# Patient Record
Sex: FEMALE | Race: WHITE | NOT HISPANIC OR LATINO
[De-identification: names, ages, dates, MRNs, and addresses within clinical notes are randomized per-mention and may not be internally consistent; named-entity substitution may affect disease eponyms.]

---

## 2017-01-19 ENCOUNTER — APPOINTMENT (OUTPATIENT)
Dept: OBGYN | Facility: CLINIC | Age: 22
End: 2017-01-19

## 2017-01-19 VITALS
WEIGHT: 166 LBS | HEIGHT: 69 IN | DIASTOLIC BLOOD PRESSURE: 80 MMHG | BODY MASS INDEX: 24.59 KG/M2 | SYSTOLIC BLOOD PRESSURE: 140 MMHG

## 2017-07-06 ENCOUNTER — APPOINTMENT (OUTPATIENT)
Dept: OBGYN | Facility: CLINIC | Age: 22
End: 2017-07-06

## 2017-07-06 VITALS
SYSTOLIC BLOOD PRESSURE: 110 MMHG | DIASTOLIC BLOOD PRESSURE: 70 MMHG | WEIGHT: 167 LBS | BODY MASS INDEX: 24.73 KG/M2 | HEIGHT: 69 IN

## 2017-07-17 LAB
C TRACH RRNA SPEC QL NAA+PROBE: NORMAL
N GONORRHOEA RRNA SPEC QL NAA+PROBE: NORMAL
SOURCE AMPLIFICATION: NORMAL
SOURCE AMPLIFICATION: NORMAL
T VAGINALIS RRNA SPEC QL NAA+PROBE: NEGATIVE

## 2018-01-11 ENCOUNTER — MEDICATION RENEWAL (OUTPATIENT)
Age: 23
End: 2018-01-11

## 2018-06-08 ENCOUNTER — APPOINTMENT (OUTPATIENT)
Dept: DERMATOLOGY | Facility: CLINIC | Age: 23
End: 2018-06-08

## 2018-07-06 ENCOUNTER — APPOINTMENT (OUTPATIENT)
Dept: DERMATOLOGY | Facility: CLINIC | Age: 23
End: 2018-07-06
Payer: COMMERCIAL

## 2018-07-06 PROCEDURE — 99214 OFFICE O/P EST MOD 30 MIN: CPT

## 2018-07-09 ENCOUNTER — APPOINTMENT (OUTPATIENT)
Dept: OBGYN | Facility: CLINIC | Age: 23
End: 2018-07-09
Payer: COMMERCIAL

## 2018-07-09 VITALS
WEIGHT: 170 LBS | DIASTOLIC BLOOD PRESSURE: 70 MMHG | BODY MASS INDEX: 25.18 KG/M2 | HEIGHT: 69 IN | SYSTOLIC BLOOD PRESSURE: 100 MMHG

## 2018-07-09 PROCEDURE — 99395 PREV VISIT EST AGE 18-39: CPT

## 2018-07-10 LAB
C TRACH RRNA SPEC QL NAA+PROBE: NOT DETECTED
N GONORRHOEA RRNA SPEC QL NAA+PROBE: NOT DETECTED
SOURCE TP AMPLIFICATION: NORMAL

## 2018-07-16 LAB — CYTOLOGY CVX/VAG DOC THIN PREP: NORMAL

## 2018-11-21 ENCOUNTER — EMERGENCY (EMERGENCY)
Facility: HOSPITAL | Age: 23
LOS: 0 days | Discharge: HOME | End: 2018-11-21
Attending: EMERGENCY MEDICINE | Admitting: EMERGENCY MEDICINE
Payer: COMMERCIAL

## 2018-11-21 VITALS
TEMPERATURE: 98 F | RESPIRATION RATE: 14 BRPM | OXYGEN SATURATION: 98 % | SYSTOLIC BLOOD PRESSURE: 135 MMHG | DIASTOLIC BLOOD PRESSURE: 79 MMHG | HEART RATE: 99 BPM

## 2018-11-21 VITALS — WEIGHT: 149.91 LBS | HEIGHT: 68 IN

## 2018-11-21 DIAGNOSIS — N12 TUBULO-INTERSTITIAL NEPHRITIS, NOT SPECIFIED AS ACUTE OR CHRONIC: ICD-10-CM

## 2018-11-21 DIAGNOSIS — R35.0 FREQUENCY OF MICTURITION: ICD-10-CM

## 2018-11-21 LAB
ANION GAP SERPL CALC-SCNC: 9 MMOL/L — SIGNIFICANT CHANGE UP (ref 5–17)
APPEARANCE UR: ABNORMAL
BASOPHILS # BLD AUTO: 0.06 K/UL — SIGNIFICANT CHANGE UP (ref 0–0.2)
BASOPHILS NFR BLD AUTO: 0.5 % — SIGNIFICANT CHANGE UP (ref 0–2)
BILIRUB UR-MCNC: NEGATIVE — SIGNIFICANT CHANGE UP
BUN SERPL-MCNC: 9 MG/DL — SIGNIFICANT CHANGE UP (ref 7–23)
CALCIUM SERPL-MCNC: 8.5 MG/DL — SIGNIFICANT CHANGE UP (ref 8.5–10.1)
CHLORIDE SERPL-SCNC: 107 MMOL/L — SIGNIFICANT CHANGE UP (ref 96–108)
CO2 SERPL-SCNC: 26 MMOL/L — SIGNIFICANT CHANGE UP (ref 22–31)
COLOR SPEC: YELLOW — SIGNIFICANT CHANGE UP
CREAT SERPL-MCNC: 0.66 MG/DL — SIGNIFICANT CHANGE UP (ref 0.5–1.3)
DIFF PNL FLD: ABNORMAL
EOSINOPHIL # BLD AUTO: 0.08 K/UL — SIGNIFICANT CHANGE UP (ref 0–0.5)
EOSINOPHIL NFR BLD AUTO: 0.7 % — SIGNIFICANT CHANGE UP (ref 0–6)
GLUCOSE SERPL-MCNC: 98 MG/DL — SIGNIFICANT CHANGE UP (ref 70–99)
GLUCOSE UR QL: NEGATIVE MG/DL — SIGNIFICANT CHANGE UP
HCT VFR BLD CALC: 40.5 % — SIGNIFICANT CHANGE UP (ref 34.5–45)
HGB BLD-MCNC: 13.5 G/DL — SIGNIFICANT CHANGE UP (ref 11.5–15.5)
IMM GRANULOCYTES NFR BLD AUTO: 0.3 % — SIGNIFICANT CHANGE UP (ref 0–1.5)
INR BLD: 1.05 RATIO — SIGNIFICANT CHANGE UP (ref 0.88–1.16)
KETONES UR-MCNC: NEGATIVE — SIGNIFICANT CHANGE UP
LEUKOCYTE ESTERASE UR-ACNC: ABNORMAL
LYMPHOCYTES # BLD AUTO: 1.37 K/UL — SIGNIFICANT CHANGE UP (ref 1–3.3)
LYMPHOCYTES # BLD AUTO: 11.2 % — LOW (ref 13–44)
MCHC RBC-ENTMCNC: 30.3 PG — SIGNIFICANT CHANGE UP (ref 27–34)
MCHC RBC-ENTMCNC: 33.3 GM/DL — SIGNIFICANT CHANGE UP (ref 32–36)
MCV RBC AUTO: 90.8 FL — SIGNIFICANT CHANGE UP (ref 80–100)
MONOCYTES # BLD AUTO: 0.73 K/UL — SIGNIFICANT CHANGE UP (ref 0–0.9)
MONOCYTES NFR BLD AUTO: 6 % — SIGNIFICANT CHANGE UP (ref 2–14)
NEUTROPHILS # BLD AUTO: 9.91 K/UL — HIGH (ref 1.8–7.4)
NEUTROPHILS NFR BLD AUTO: 81.3 % — HIGH (ref 43–77)
NITRITE UR-MCNC: NEGATIVE — SIGNIFICANT CHANGE UP
PH UR: 7 — SIGNIFICANT CHANGE UP (ref 5–8)
PLATELET # BLD AUTO: 265 K/UL — SIGNIFICANT CHANGE UP (ref 150–400)
POTASSIUM SERPL-MCNC: 4.3 MMOL/L — SIGNIFICANT CHANGE UP (ref 3.5–5.3)
POTASSIUM SERPL-SCNC: 4.3 MMOL/L — SIGNIFICANT CHANGE UP (ref 3.5–5.3)
PROT UR-MCNC: 100 MG/DL
PROTHROM AB SERPL-ACNC: 11.7 SEC — SIGNIFICANT CHANGE UP (ref 10–12.9)
RBC # BLD: 4.46 M/UL — SIGNIFICANT CHANGE UP (ref 3.8–5.2)
RBC # FLD: 12.8 % — SIGNIFICANT CHANGE UP (ref 10.3–14.5)
SODIUM SERPL-SCNC: 142 MMOL/L — SIGNIFICANT CHANGE UP (ref 135–145)
SP GR SPEC: 1 — LOW (ref 1.01–1.02)
UROBILINOGEN FLD QL: NEGATIVE MG/DL — SIGNIFICANT CHANGE UP
WBC # BLD: 12.19 K/UL — HIGH (ref 3.8–10.5)
WBC # FLD AUTO: 12.19 K/UL — HIGH (ref 3.8–10.5)

## 2018-11-21 PROCEDURE — 99285 EMERGENCY DEPT VISIT HI MDM: CPT | Mod: 25

## 2018-11-21 PROCEDURE — 74176 CT ABD & PELVIS W/O CONTRAST: CPT | Mod: 26

## 2018-11-21 RX ORDER — AZTREONAM 2 G
1 VIAL (EA) INJECTION
Qty: 28 | Refills: 0 | OUTPATIENT
Start: 2018-11-21 | End: 2018-12-04

## 2018-11-21 RX ORDER — SODIUM CHLORIDE 9 MG/ML
1000 INJECTION INTRAMUSCULAR; INTRAVENOUS; SUBCUTANEOUS ONCE
Qty: 0 | Refills: 0 | Status: COMPLETED | OUTPATIENT
Start: 2018-11-21 | End: 2018-11-21

## 2018-11-21 RX ORDER — CEFTRIAXONE 500 MG/1
1000 INJECTION, POWDER, FOR SOLUTION INTRAMUSCULAR; INTRAVENOUS ONCE
Qty: 0 | Refills: 0 | Status: COMPLETED | OUTPATIENT
Start: 2018-11-21 | End: 2018-11-21

## 2018-11-21 RX ORDER — CEFTRIAXONE 500 MG/1
1 INJECTION, POWDER, FOR SOLUTION INTRAMUSCULAR; INTRAVENOUS ONCE
Qty: 0 | Refills: 0 | Status: DISCONTINUED | OUTPATIENT
Start: 2018-11-21 | End: 2018-11-21

## 2018-11-21 RX ORDER — MORPHINE SULFATE 50 MG/1
2 CAPSULE, EXTENDED RELEASE ORAL ONCE
Qty: 0 | Refills: 0 | Status: DISCONTINUED | OUTPATIENT
Start: 2018-11-21 | End: 2018-11-21

## 2018-11-21 RX ADMIN — SODIUM CHLORIDE 1000 MILLILITER(S): 9 INJECTION INTRAMUSCULAR; INTRAVENOUS; SUBCUTANEOUS at 14:42

## 2018-11-21 RX ADMIN — MORPHINE SULFATE 2 MILLIGRAM(S): 50 CAPSULE, EXTENDED RELEASE ORAL at 13:56

## 2018-11-21 RX ADMIN — CEFTRIAXONE 1000 MILLIGRAM(S): 500 INJECTION, POWDER, FOR SOLUTION INTRAMUSCULAR; INTRAVENOUS at 15:12

## 2018-11-21 RX ADMIN — SODIUM CHLORIDE 2000 MILLILITER(S): 9 INJECTION INTRAMUSCULAR; INTRAVENOUS; SUBCUTANEOUS at 13:42

## 2018-11-21 NOTE — ED STATDOCS - NS_ ATTENDINGSCRIBEDETAILS _ED_A_ED_FT
I, Carlos Forde MD,  performed the initial face to face bedside interview with this patient regarding history of present illness, review of symptoms and relevant past medical, social and family history.  I completed an independent physical examination.    The history, relevant review of systems, past medical and surgical history, medical decision making, and physical examination was documented by the scribe in my presence and I attest to the accuracy of the documentation.

## 2018-11-21 NOTE — ED ADULT NURSE NOTE - OBJECTIVE STATEMENT
Pt alert and oriented x3. pt presents with Rt sided flank pain and urinary symptoms for a few days. denies hematuria.

## 2018-11-21 NOTE — ED STATDOCS - ATTENDING CONTRIBUTION TO CARE
I, Carlos Forde MD,  performed the initial face to face bedside interview with this patient regarding history of present illness, review of symptoms and relevant past medical, social and family history.  I completed an independent physical examination.  I was the initial provider who evaluated this patient. I have signed out the follow up of any pending tests (i.e. labs, radiological studies) to the ACP.  I have communicated the patient’s plan of care and disposition with the ACP.

## 2018-11-21 NOTE — ED ADULT TRIAGE NOTE - CHIEF COMPLAINT QUOTE
right flank pain right flank pain, took 600mg ibuprofen at 0830 this morning and 1000mg tylenol at 1130 with no relief right flank pain, increase in urinary frequency, started yesterday, took 600mg ibuprofen at 0830 this morning and 1000mg tylenol at 1130 with no relief

## 2018-11-21 NOTE — ED ADULT NURSE NOTE - CHIEF COMPLAINT QUOTE
right flank pain, increase in urinary frequency, started yesterday, took 600mg ibuprofen at 0830 this morning and 1000mg tylenol at 1130 with no relief

## 2018-11-21 NOTE — ED STATDOCS - PROGRESS NOTE DETAILS
22 y/o F with no PMHx presenting to the ED c/o sharp, shooting R flank pain starting yesterday after pt woke up, associated urinary urgency x6 days. Denies any CP, SOB, abd pain, N/V/D, fevers, or chills. Took 600mg Ibuprofen at 0830 and 1000mg Tylenol at 1130 today without relief. No FHx of kidney stones.   PE: +R CVA tenderness, abd soft, non-tender, non-distended, lungs CAT b/l, heart RRR s1/s2  Plan: CT a/p to r/o kidney stones and pyelo, labs, UA, urine cx  Geri Butler PA-C CT A/P showed Mild right hydroureteronephrosis and perinephric stranding. No obstructing stone is identified. Findings could be due to recently passed stone versus pyelonephritis. WBC 12, UA suggestive of infection, culture pending.  Plan to give IV rocephin in ED, d/c home with bactrim rx, f/u PMD.  Pt and mother agreeable to d/c and plan of care, all questions answered, return precautions given  Geri Butler PA-C

## 2018-11-21 NOTE — ED ADULT NURSE NOTE - NSIMPLEMENTINTERV_GEN_ALL_ED
Implemented All Universal Safety Interventions:  [place] to call system. Call bell, personal items and telephone within reach. Instruct patient to call for assistance. Room bathroom lighting operational. Non-slip footwear when patient is off stretcher. Physically safe environment: no spills, clutter or unnecessary equipment. Stretcher in lowest position, wheels locked, appropriate side rails in place.

## 2018-11-21 NOTE — ED STATDOCS - OBJECTIVE STATEMENT
22 y/o F with no pertinent PMHx presenting to the ED c/o sharp, shooting right flank pain since yesterday after pt woke up. +urinary urgency x6 days. Denies any CP, SOB, abd pain, N/V/D, fevers, or chills. Took 600mg Ibuprofen at 0830 and 1000mg Tylenol at 1130 today without relief. No FHx of kidney stones. No daily medications. NKDA. PMD: Dr. Moya.

## 2018-11-22 LAB
CULTURE RESULTS: SIGNIFICANT CHANGE UP
SPECIMEN SOURCE: SIGNIFICANT CHANGE UP

## 2018-11-23 NOTE — ED POST DISCHARGE NOTE - RESULT SUMMARY
+contaminated UC. Called both numbers on file in the pt's chart and both numbers would not allowing me to leave a VM.

## 2019-02-22 ENCOUNTER — MEDICATION RENEWAL (OUTPATIENT)
Age: 24
End: 2019-02-22

## 2019-05-18 ENCOUNTER — MEDICATION RENEWAL (OUTPATIENT)
Age: 24
End: 2019-05-18

## 2019-05-24 NOTE — ED STATDOCS - SKIN, MLM
Mike Malik to discharge home, will follow up in 2 weeks for hardware removal.  Pt needs pain meds prescription to last till surgery, Ortho not managing.  Paged hospitalist for prescription order.     skin normal color for race, warm, dry and intact.

## 2019-05-30 ENCOUNTER — APPOINTMENT (OUTPATIENT)
Dept: OBGYN | Facility: CLINIC | Age: 24
End: 2019-05-30
Payer: COMMERCIAL

## 2019-05-30 VITALS
SYSTOLIC BLOOD PRESSURE: 110 MMHG | HEIGHT: 69 IN | WEIGHT: 170 LBS | BODY MASS INDEX: 25.18 KG/M2 | DIASTOLIC BLOOD PRESSURE: 80 MMHG

## 2019-05-30 DIAGNOSIS — Z78.9 OTHER SPECIFIED HEALTH STATUS: ICD-10-CM

## 2019-05-30 PROCEDURE — 99213 OFFICE O/P EST LOW 20 MIN: CPT

## 2019-06-03 ENCOUNTER — APPOINTMENT (OUTPATIENT)
Dept: DERMATOLOGY | Facility: CLINIC | Age: 24
End: 2019-06-03
Payer: COMMERCIAL

## 2019-06-03 PROCEDURE — 17110 DESTRUCTION B9 LES UP TO 14: CPT

## 2019-06-03 PROCEDURE — 99213 OFFICE O/P EST LOW 20 MIN: CPT | Mod: 25

## 2019-06-12 ENCOUNTER — EMERGENCY (EMERGENCY)
Facility: HOSPITAL | Age: 24
LOS: 0 days | Discharge: HOME | End: 2019-06-12
Attending: EMERGENCY MEDICINE | Admitting: EMERGENCY MEDICINE
Payer: COMMERCIAL

## 2019-06-12 VITALS — HEART RATE: 90 BPM

## 2019-06-12 VITALS — HEIGHT: 68 IN | WEIGHT: 145.06 LBS

## 2019-06-12 DIAGNOSIS — N39.0 URINARY TRACT INFECTION, SITE NOT SPECIFIED: ICD-10-CM

## 2019-06-12 DIAGNOSIS — R30.0 DYSURIA: ICD-10-CM

## 2019-06-12 DIAGNOSIS — R55 SYNCOPE AND COLLAPSE: ICD-10-CM

## 2019-06-12 LAB
ALBUMIN SERPL ELPH-MCNC: 3.9 G/DL — SIGNIFICANT CHANGE UP (ref 3.3–5)
ALP SERPL-CCNC: 58 U/L — SIGNIFICANT CHANGE UP (ref 40–120)
ALT FLD-CCNC: 21 U/L — SIGNIFICANT CHANGE UP (ref 12–78)
ANION GAP SERPL CALC-SCNC: 6 MMOL/L — SIGNIFICANT CHANGE UP (ref 5–17)
APPEARANCE UR: ABNORMAL
AST SERPL-CCNC: 21 U/L — SIGNIFICANT CHANGE UP (ref 15–37)
BACTERIA # UR AUTO: ABNORMAL
BASOPHILS # BLD AUTO: 0.02 K/UL — SIGNIFICANT CHANGE UP (ref 0–0.2)
BASOPHILS NFR BLD AUTO: 0.2 % — SIGNIFICANT CHANGE UP (ref 0–2)
BILIRUB SERPL-MCNC: 0.3 MG/DL — SIGNIFICANT CHANGE UP (ref 0.2–1.2)
BILIRUB UR-MCNC: NEGATIVE — SIGNIFICANT CHANGE UP
BUN SERPL-MCNC: 10 MG/DL — SIGNIFICANT CHANGE UP (ref 7–23)
CALCIUM SERPL-MCNC: 9 MG/DL — SIGNIFICANT CHANGE UP (ref 8.5–10.1)
CHLORIDE SERPL-SCNC: 102 MMOL/L — SIGNIFICANT CHANGE UP (ref 96–108)
CO2 SERPL-SCNC: 26 MMOL/L — SIGNIFICANT CHANGE UP (ref 22–31)
COLOR SPEC: YELLOW — SIGNIFICANT CHANGE UP
CREAT SERPL-MCNC: 0.99 MG/DL — SIGNIFICANT CHANGE UP (ref 0.5–1.3)
DIFF PNL FLD: ABNORMAL
EOSINOPHIL # BLD AUTO: 0.01 K/UL — SIGNIFICANT CHANGE UP (ref 0–0.5)
EOSINOPHIL NFR BLD AUTO: 0.1 % — SIGNIFICANT CHANGE UP (ref 0–6)
EPI CELLS # UR: SIGNIFICANT CHANGE UP
GLUCOSE SERPL-MCNC: 105 MG/DL — HIGH (ref 70–99)
GLUCOSE UR QL: NEGATIVE MG/DL — SIGNIFICANT CHANGE UP
HCT VFR BLD CALC: 41.1 % — SIGNIFICANT CHANGE UP (ref 34.5–45)
HGB BLD-MCNC: 13.7 G/DL — SIGNIFICANT CHANGE UP (ref 11.5–15.5)
IMM GRANULOCYTES NFR BLD AUTO: 0.4 % — SIGNIFICANT CHANGE UP (ref 0–1.5)
KETONES UR-MCNC: NEGATIVE — SIGNIFICANT CHANGE UP
LEUKOCYTE ESTERASE UR-ACNC: ABNORMAL
LYMPHOCYTES # BLD AUTO: 1.21 K/UL — SIGNIFICANT CHANGE UP (ref 1–3.3)
LYMPHOCYTES # BLD AUTO: 12.4 % — LOW (ref 13–44)
MCHC RBC-ENTMCNC: 31.2 PG — SIGNIFICANT CHANGE UP (ref 27–34)
MCHC RBC-ENTMCNC: 33.3 GM/DL — SIGNIFICANT CHANGE UP (ref 32–36)
MCV RBC AUTO: 93.6 FL — SIGNIFICANT CHANGE UP (ref 80–100)
MONOCYTES # BLD AUTO: 0.81 K/UL — SIGNIFICANT CHANGE UP (ref 0–0.9)
MONOCYTES NFR BLD AUTO: 8.3 % — SIGNIFICANT CHANGE UP (ref 2–14)
NEUTROPHILS # BLD AUTO: 7.68 K/UL — HIGH (ref 1.8–7.4)
NEUTROPHILS NFR BLD AUTO: 78.6 % — HIGH (ref 43–77)
NITRITE UR-MCNC: NEGATIVE — SIGNIFICANT CHANGE UP
PH UR: 7 — SIGNIFICANT CHANGE UP (ref 5–8)
PLATELET # BLD AUTO: 186 K/UL — SIGNIFICANT CHANGE UP (ref 150–400)
POTASSIUM SERPL-MCNC: 3.5 MMOL/L — SIGNIFICANT CHANGE UP (ref 3.5–5.3)
POTASSIUM SERPL-SCNC: 3.5 MMOL/L — SIGNIFICANT CHANGE UP (ref 3.5–5.3)
PROT SERPL-MCNC: 7.5 GM/DL — SIGNIFICANT CHANGE UP (ref 6–8.3)
PROT UR-MCNC: 30 MG/DL
RBC # BLD: 4.39 M/UL — SIGNIFICANT CHANGE UP (ref 3.8–5.2)
RBC # FLD: 13.2 % — SIGNIFICANT CHANGE UP (ref 10.3–14.5)
RBC CASTS # UR COMP ASSIST: ABNORMAL /HPF (ref 0–4)
SODIUM SERPL-SCNC: 134 MMOL/L — LOW (ref 135–145)
SP GR SPEC: 1.01 — SIGNIFICANT CHANGE UP (ref 1.01–1.02)
UROBILINOGEN FLD QL: NEGATIVE MG/DL — SIGNIFICANT CHANGE UP
WBC # BLD: 9.77 K/UL — SIGNIFICANT CHANGE UP (ref 3.8–10.5)
WBC # FLD AUTO: 9.77 K/UL — SIGNIFICANT CHANGE UP (ref 3.8–10.5)
WBC UR QL: ABNORMAL

## 2019-06-12 PROCEDURE — 93010 ELECTROCARDIOGRAM REPORT: CPT

## 2019-06-12 PROCEDURE — 99285 EMERGENCY DEPT VISIT HI MDM: CPT | Mod: 25

## 2019-06-12 RX ORDER — CEFTRIAXONE 500 MG/1
1000 INJECTION, POWDER, FOR SOLUTION INTRAMUSCULAR; INTRAVENOUS ONCE
Refills: 0 | Status: COMPLETED | OUTPATIENT
Start: 2019-06-12 | End: 2019-06-12

## 2019-06-12 RX ORDER — SODIUM CHLORIDE 9 MG/ML
2000 INJECTION INTRAMUSCULAR; INTRAVENOUS; SUBCUTANEOUS ONCE
Refills: 0 | Status: COMPLETED | OUTPATIENT
Start: 2019-06-12 | End: 2019-06-12

## 2019-06-12 RX ORDER — CEFTRIAXONE 500 MG/1
1 INJECTION, POWDER, FOR SOLUTION INTRAMUSCULAR; INTRAVENOUS ONCE
Refills: 0 | Status: DISCONTINUED | OUTPATIENT
Start: 2019-06-12 | End: 2019-06-12

## 2019-06-12 RX ORDER — KETOROLAC TROMETHAMINE 30 MG/ML
30 SYRINGE (ML) INJECTION ONCE
Refills: 0 | Status: DISCONTINUED | OUTPATIENT
Start: 2019-06-12 | End: 2019-06-12

## 2019-06-12 RX ORDER — AZTREONAM 2 G
1 VIAL (EA) INJECTION
Qty: 20 | Refills: 0
Start: 2019-06-12 | End: 2019-06-21

## 2019-06-12 RX ADMIN — CEFTRIAXONE 1000 MILLIGRAM(S): 500 INJECTION, POWDER, FOR SOLUTION INTRAMUSCULAR; INTRAVENOUS at 06:54

## 2019-06-12 RX ADMIN — SODIUM CHLORIDE 2000 MILLILITER(S): 9 INJECTION INTRAMUSCULAR; INTRAVENOUS; SUBCUTANEOUS at 06:42

## 2019-06-12 RX ADMIN — Medication 30 MILLIGRAM(S): at 06:41

## 2019-06-12 NOTE — ED PROVIDER NOTE - OBJECTIVE STATEMENT
22 y/o female in ED s/p syncope x 1hr.   pt states has been having increase frequency and dysuria past few days.   states this morning when to bathroom to urinate and states passed out for a few seconds while on the toilet.   pt denies any HA, neck pain, cp, sob, n/v/d/abd pain.   states subjective fever.    no meds taken for symptoms.

## 2019-06-12 NOTE — ED PROVIDER NOTE - CLINICAL SUMMARY MEDICAL DECISION MAKING FREE TEXT BOX
pt with syncope while on toilet possible vasovagal.   dysuria.   will check EKG, labs, UA, IVF, meds and reeval

## 2019-06-12 NOTE — ED PROVIDER NOTE - NSFOLLOWUPINSTRUCTIONS_ED_ALL_ED_FT
please follow up with your doctor in 2-3 days.   take medication as prescribed.  take motrin and tylenol for pain.  drink plenty of fluids.   return to ED for any concerns

## 2019-06-12 NOTE — ED ADULT NURSE NOTE - NSIMPLEMENTINTERV_GEN_ALL_ED
Implemented All Universal Safety Interventions:  [place] to call system. Call bell, personal items and telephone within reach. Instruct patient to call for assistance. Room bathroom lighting operational. Non-slip footwear when patient is off stretcher. Physically safe environment: no spills, clutter or unnecessary equipment. Stretcher in lowest position, wheels locked, appropriate side rails in place.

## 2019-06-12 NOTE — ED ADULT TRIAGE NOTE - CHIEF COMPLAINT QUOTE
patient became dizzy and passed out while on toilet. hit right side of head on ground, states she is not sure if she fully lost consciousness or not. has worsening burning during urination for past 2 days, thinks she has UTI. history of UTI last year. denies hematuria. alert and oriented x 4 at triage, ambulatory with steady gait.

## 2019-06-12 NOTE — ED PROVIDER NOTE - NS_EDPROVIDERDISPOUSERTYPE_ED_A_ED
----- Message from Margo Mathew MD sent at 12/7/2018 10:46 AM EST -----  Regarding:   Mirena    Please check Mirena Intrauterine device benefits  She will have this inserted ASAP   She has an IUD in now Attending Attestation (For Attendings USE Only)...

## 2019-06-13 ENCOUNTER — TRANSCRIPTION ENCOUNTER (OUTPATIENT)
Age: 24
End: 2019-06-13

## 2019-06-13 ENCOUNTER — APPOINTMENT (OUTPATIENT)
Dept: OBGYN | Facility: CLINIC | Age: 24
End: 2019-06-13
Payer: COMMERCIAL

## 2019-06-13 VITALS — SYSTOLIC BLOOD PRESSURE: 130 MMHG | DIASTOLIC BLOOD PRESSURE: 90 MMHG

## 2019-06-13 DIAGNOSIS — A60.00 HERPESVIRAL INFECTION OF UROGENITAL SYSTEM, UNSPECIFIED: ICD-10-CM

## 2019-06-13 PROBLEM — N39.0 URINARY TRACT INFECTION, SITE NOT SPECIFIED: Chronic | Status: ACTIVE | Noted: 2019-06-12

## 2019-06-13 PROCEDURE — 99214 OFFICE O/P EST MOD 30 MIN: CPT

## 2019-06-13 NOTE — PHYSICAL EXAM
[Normal] : uterus [Multiple] : multiple [Grouped] : that were grouped [Motion Tenderness] : there was no cervical motion tenderness [Tenderness] : nontender [Adnexa Tenderness] : were not tender [FreeTextEntry4] : discolored discharge and a large volume culture gc chl tric hsv

## 2019-06-14 LAB
HBV SURFACE AG SER QL: NONREACTIVE
HCV AB SER QL: NONREACTIVE
HCV S/CO RATIO: 0.12 S/CO
HIV1+2 AB SPEC QL IA.RAPID: NONREACTIVE

## 2019-06-14 RX ORDER — LIDOCAINE HYDROCHLORIDE 30 MG/G
3 CREAM TOPICAL
Qty: 1 | Refills: 1 | Status: DISCONTINUED | COMMUNITY
Start: 2019-06-13 | End: 2019-06-14

## 2019-06-17 LAB
HSV 1+2 IGG SER IA-IMP: NEGATIVE
HSV 1+2 IGG SER IA-IMP: NEGATIVE
HSV1 IGG SER QL: <0.01 INDEX
HSV1 IGM SER QL: NORMAL TITER
HSV2 AB FLD-ACNC: NORMAL TITER
HSV2 IGG SER QL: 0.1 INDEX
T PALLIDUM AB SER QL IA: NEGATIVE

## 2019-06-20 LAB
C TRACH RRNA SPEC QL NAA+PROBE: NOT DETECTED
HSV+VZV DNA SPEC QL NAA+PROBE: ABNORMAL
N GONORRHOEA RRNA SPEC QL NAA+PROBE: NOT DETECTED
SOURCE AMPLIFICATION: NORMAL
SOURCE AMPLIFICATION: NORMAL
SPECIMEN SOURCE: NORMAL
T VAGINALIS RRNA SPEC QL NAA+PROBE: NOT DETECTED

## 2019-09-08 ENCOUNTER — EMERGENCY (EMERGENCY)
Facility: HOSPITAL | Age: 24
LOS: 0 days | Discharge: HOME | End: 2019-09-08
Attending: EMERGENCY MEDICINE
Payer: COMMERCIAL

## 2019-09-08 VITALS
DIASTOLIC BLOOD PRESSURE: 95 MMHG | SYSTOLIC BLOOD PRESSURE: 138 MMHG | HEART RATE: 89 BPM | TEMPERATURE: 98 F | RESPIRATION RATE: 16 BRPM | OXYGEN SATURATION: 100 %

## 2019-09-08 VITALS — HEIGHT: 69 IN | WEIGHT: 169.98 LBS
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
## 2019-09-08 DIAGNOSIS — S61.231A PUNCTURE WOUND WITHOUT FOREIGN BODY OF LEFT INDEX FINGER WITHOUT DAMAGE TO NAIL, INITIAL ENCOUNTER: ICD-10-CM
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
## 2019-09-08 PROCEDURE — 99282 EMERGENCY DEPT VISIT SF MDM: CPT

## 2019-09-08 NOTE — ED STATDOCS - SKIN, MLM
skin normal color for race, warm, dry. Left index finger lateral side pt points to site of needle stick. No visible skin break at this time.

## 2019-09-08 NOTE — ED STATDOCS - PROGRESS NOTE DETAILS
Source patient in [employer]  was tested for rapid HIV and it's NEG. Employee needlestick packet drawn and sent. Patient REFUSED PEP treatment at this time. TDAP is up to date. ED evaluation, Diagnosis and management discussed with the patient and family in detail. discussed with ED attending, OK to dc home.  Close PMD/Employee health follow up encouraged.  Strict ED return instructions discussed in detail and patient given the opportunity to ask any questions about their discharge diagnosis and instructions. Patient verbalized understanding. ~ TORREY Harmon

## 2019-09-08 NOTE — ED STATDOCS - PHYSICAL EXAMINATION
GEN: AOX3, NAD. HEENT: Throat clear. Airway is patent. EYES: PERRLA. EOMI. Head: NC/AT. NECK: Supple, No JVD. FROM. C-spine non-tender. CV:S1S2, RRR, LUNGS: CTA b/l, no w/r/r. CHEST: Equal chest expansion and rise. No deformity. ABD: Soft, NT/ND, no rebound, no guarding. No CVAT. EXT: No e/c/c. 2+ distal pulses. SKIN: No rashes. LEFT INDEX FINGER: No obvious signs of puncture wound at stated injured area. No bleeding. NEURO: No focal deficits. CN II-XII intact. FROM. 5/5 motor and sensory. TORREY Harmon

## 2019-09-08 NOTE — ED ADULT NURSE NOTE - OBJECTIVE STATEMENT
pt presents to ED s/p needlestick to the L. 2nd digit while closing and insulin needle after injecting a pt. Pt agreed to blood work. Labs drawn and sent to [facility] needle stick packet completed and dropped off at [facility].

## 2019-09-08 NOTE — ED STATDOCS - ATTENDING CONTRIBUTION TO CARE
I, Gaby Adames MD,  performed the initial face to face bedside interview with this patient regarding history of present illness, review of symptoms and relevant past medical, social and family history.  I completed an independent physical examination.  I was the initial provider who evaluated this patient. I have signed out the follow up of any pending tests (i.e. labs, radiological studies) to the ACP.  I have communicated the patient’s plan of care and disposition with the ACP.  The history, relevant review of systems, past medical and surgical history, medical decision making, and physical examination was documented by the scribe in my presence and I attest to the accuracy of the documentation.

## 2019-09-08 NOTE — ED STATDOCS - PATIENT PORTAL LINK FT
You can access the FollowMyHealth Patient Portal offered by [facility] by registering at the following website: [email or web address]. By joining [facility]’s FollowMyHealth portal, you will also be able to view your health information using other applications (apps) compatible with our system.

## 2019-09-08 NOTE — ED STATDOCS - OBJECTIVE STATEMENT
23 y/o female with a PMHx of UTI presents to the ED s/p needle stick to left second digit. Pt notes she works on [employer] at , was giving insulin, pulled the needle out and nicked her finger. Area did not bleed after. Pt washed area after. No other complaints at this time.

## 2019-12-18 ENCOUNTER — MEDICATION RENEWAL (OUTPATIENT)
Age: 24
End: 2019-12-18

## 2019-12-26 ENCOUNTER — APPOINTMENT (OUTPATIENT)
Dept: OBGYN | Facility: CLINIC | Age: 24
End: 2019-12-26
Payer: COMMERCIAL

## 2019-12-26 VITALS
DIASTOLIC BLOOD PRESSURE: 74 MMHG | SYSTOLIC BLOOD PRESSURE: 120 MMHG | WEIGHT: 191 LBS | HEIGHT: 69 IN | BODY MASS INDEX: 28.29 KG/M2

## 2019-12-26 PROCEDURE — 99213 OFFICE O/P EST LOW 20 MIN: CPT

## 2019-12-26 NOTE — PHYSICAL EXAM
[Awake] : awake [Alert] : alert [Acute Distress] : no acute distress [Mass] : no breast mass [Nipple Discharge] : no nipple discharge [Axillary LAD] : no axillary lymphadenopathy [Distended] : not distended [Soft] : soft [Tender] : non tender [H/Smegaly] : no hepatosplenomegaly [Oriented x3] : oriented to person, place, and time [Flat Affect] : affect not flat [Depressed Mood] : not depressed

## 2020-04-25 ENCOUNTER — MESSAGE (OUTPATIENT)
Age: 25
End: 2020-04-25

## 2020-05-01 LAB
SARS-COV-2 IGG SERPL IA-ACNC: <0.1 INDEX
SARS-COV-2 IGG SERPL QL IA: NEGATIVE

## 2020-06-12 ENCOUNTER — RX RENEWAL (OUTPATIENT)
Age: 25
End: 2020-06-12

## 2020-07-15 ENCOUNTER — TRANSCRIPTION ENCOUNTER (OUTPATIENT)
Age: 25
End: 2020-07-15

## 2020-07-16 ENCOUNTER — APPOINTMENT (OUTPATIENT)
Dept: OBGYN | Facility: CLINIC | Age: 25
End: 2020-07-16
Payer: COMMERCIAL

## 2020-07-16 VITALS
HEIGHT: 68 IN | SYSTOLIC BLOOD PRESSURE: 140 MMHG | WEIGHT: 165 LBS | TEMPERATURE: 98.5 F | BODY MASS INDEX: 25.01 KG/M2 | DIASTOLIC BLOOD PRESSURE: 90 MMHG

## 2020-07-16 PROCEDURE — 99395 PREV VISIT EST AGE 18-39: CPT

## 2020-07-16 NOTE — HISTORY OF PRESENT ILLNESS
[1 Year Ago] : 1 year ago [Good] : being in good health [Healthy Diet] : a healthy diet [Regular Exercise] : regular exercise [Weight Concerns] : no concerns with her weight [Reproductive Age] : is of reproductive age [Contraception] : uses contraception [HPV Vaccine ___] : HPV vaccine [unfilled] [Sexually Active] : is sexually active [Male ___] : [unfilled] male [Yes] : yes

## 2020-07-16 NOTE — PHYSICAL EXAM
[Awake] : awake [Acute Distress] : no acute distress [Alert] : alert [LAD] : no lymphadenopathy [Thyroid Nodule] : no thyroid nodule [Mass] : no breast mass [Goiter] : no goiter [Nipple Discharge] : no nipple discharge [Axillary LAD] : no axillary lymphadenopathy [Soft] : soft [Tender] : non tender [Distended] : not distended [H/Smegaly] : no hepatosplenomegaly [Oriented x3] : oriented to person, place, and time [Depressed Mood] : not depressed [Flat Affect] : affect not flat [Labia Majora] : labia major [Normal] : clitoris [Labia Minora] : labia minora [No Bleeding] : there was no active vaginal bleeding [Pap Obtained] : a Pap smear was performed [Uterine Adnexae] : were not tender and not enlarged

## 2021-01-14 ENCOUNTER — APPOINTMENT (OUTPATIENT)
Dept: OBGYN | Facility: CLINIC | Age: 26
End: 2021-01-14
Payer: COMMERCIAL

## 2021-01-14 VITALS
BODY MASS INDEX: 30.77 KG/M2 | SYSTOLIC BLOOD PRESSURE: 130 MMHG | HEIGHT: 68 IN | WEIGHT: 203 LBS | TEMPERATURE: 98.3 F | DIASTOLIC BLOOD PRESSURE: 80 MMHG

## 2021-01-14 DIAGNOSIS — Z78.9 OTHER SPECIFIED HEALTH STATUS: ICD-10-CM

## 2021-01-14 PROCEDURE — 99072 ADDL SUPL MATRL&STAF TM PHE: CPT

## 2021-01-14 PROCEDURE — 99213 OFFICE O/P EST LOW 20 MIN: CPT

## 2021-01-14 NOTE — HISTORY OF PRESENT ILLNESS
[FreeTextEntry1] : 26 yo here for oc renewal. SHe went on nights and has gained some weight.  SHe is trying to go back to night.

## 2021-06-11 ENCOUNTER — TRANSCRIPTION ENCOUNTER (OUTPATIENT)
Age: 26
End: 2021-06-11

## 2021-07-14 ENCOUNTER — RX RENEWAL (OUTPATIENT)
Age: 26
End: 2021-07-14

## 2021-07-29 ENCOUNTER — APPOINTMENT (OUTPATIENT)
Dept: OBGYN | Facility: CLINIC | Age: 26
End: 2021-07-29
Payer: COMMERCIAL

## 2021-07-29 VITALS
DIASTOLIC BLOOD PRESSURE: 80 MMHG | BODY MASS INDEX: 28.79 KG/M2 | TEMPERATURE: 97.6 F | SYSTOLIC BLOOD PRESSURE: 120 MMHG | HEIGHT: 68 IN | WEIGHT: 190 LBS

## 2021-07-29 PROCEDURE — 99395 PREV VISIT EST AGE 18-39: CPT

## 2021-07-29 NOTE — DISCUSSION/SUMMARY
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 \par no smoking, RBAD .\par Discussed the risks of DVT and blood clots,strokes\par  good and bad side effects of the pill discussed and instructions on how to take pills and when to use back up. \par Encouraged exercise , \par good diet filled with,plant based foods, calcium and vit.D.rtn 6 months. \par Discussed SBE,\par Discussed the NIH suggests minimum of 2.5 hours of exercise a week\par If contracted covid call office to change to progesterone only pill(Slynd) for 3 months) DIsc. hypercoagulative state/or ASA therapy low dose\par Answered any questions she may have.\par

## 2021-07-29 NOTE — HISTORY OF PRESENT ILLNESS
[Currently Active] : currently active [Men] : men [Vaginal] : vaginal [No] : No [FreeTextEntry1] : 27 yo here for av and oc refill she had vaccine for covid , no illness. on junel 24 doing well no comp.Same partner

## 2021-08-31 ENCOUNTER — OUTPATIENT (OUTPATIENT)
Dept: OUTPATIENT SERVICES | Facility: HOSPITAL | Age: 26
LOS: 1 days | End: 2021-08-31
Payer: COMMERCIAL

## 2021-08-31 DIAGNOSIS — Z20.828 CONTACT WITH AND (SUSPECTED) EXPOSURE TO OTHER VIRAL COMMUNICABLE DISEASES: ICD-10-CM

## 2021-08-31 LAB — SARS-COV-2 RNA SPEC QL NAA+PROBE: SIGNIFICANT CHANGE UP

## 2021-08-31 PROCEDURE — U0005: CPT

## 2021-08-31 PROCEDURE — U0003: CPT

## 2021-08-31 PROCEDURE — C9803: CPT

## 2021-09-01 DIAGNOSIS — Z20.828 CONTACT WITH AND (SUSPECTED) EXPOSURE TO OTHER VIRAL COMMUNICABLE DISEASES: ICD-10-CM

## 2021-11-17 ENCOUNTER — MED ADMIN CHARGE (OUTPATIENT)
Age: 26
End: 2021-11-17

## 2021-11-17 ENCOUNTER — APPOINTMENT (OUTPATIENT)
Dept: FAMILY MEDICINE | Facility: CLINIC | Age: 26
End: 2021-11-17
Payer: COMMERCIAL

## 2021-11-17 VITALS
DIASTOLIC BLOOD PRESSURE: 76 MMHG | HEIGHT: 68 IN | BODY MASS INDEX: 30.62 KG/M2 | WEIGHT: 202 LBS | HEART RATE: 94 BPM | OXYGEN SATURATION: 99 % | TEMPERATURE: 96.9 F | SYSTOLIC BLOOD PRESSURE: 114 MMHG

## 2021-11-17 DIAGNOSIS — Z11.3 ENCOUNTER FOR SCREENING FOR INFECTIONS WITH A PREDOMINANTLY SEXUAL MODE OF TRANSMISSION: ICD-10-CM

## 2021-11-17 DIAGNOSIS — Z00.00 ENCOUNTER FOR GENERAL ADULT MEDICAL EXAMINATION W/OUT ABNORMAL FINDINGS: ICD-10-CM

## 2021-11-17 DIAGNOSIS — Z23 ENCOUNTER FOR IMMUNIZATION: ICD-10-CM

## 2021-11-17 PROCEDURE — 99395 PREV VISIT EST AGE 18-39: CPT | Mod: 25

## 2021-11-17 PROCEDURE — 36415 COLL VENOUS BLD VENIPUNCTURE: CPT

## 2021-11-17 PROCEDURE — 90471 IMMUNIZATION ADMIN: CPT

## 2021-11-17 PROCEDURE — 90715 TDAP VACCINE 7 YRS/> IM: CPT

## 2021-11-17 RX ORDER — LIDOCAINE 5 G/100G
5 OINTMENT TOPICAL
Qty: 1 | Refills: 0 | Status: COMPLETED | COMMUNITY
Start: 2019-06-14 | End: 2021-11-17

## 2021-11-17 RX ORDER — NORETHINDRONE ACETATE AND ETHINYL ESTRADIOL AND FERROUS FUMARATE 1MG-20(24)
1-20 KIT ORAL
Qty: 3 | Refills: 1 | Status: COMPLETED | COMMUNITY
Start: 2018-07-09 | End: 2021-11-17

## 2021-11-17 RX ORDER — NORETHINDRONE ACETATE AND ETHINYL ESTRADIOL 1MG-20(21)
1-20 KIT ORAL
Qty: 90 | Refills: 1 | Status: COMPLETED | COMMUNITY
Start: 2017-07-06 | End: 2021-11-17

## 2021-11-17 RX ORDER — NORETHINDRONE ACETATE, ETHINYL ESTRADIOL AND FERROUS FUMARATE 1MG-20(24)
1-20 KIT ORAL
Qty: 1 | Refills: 0 | Status: COMPLETED | COMMUNITY
Start: 2018-01-11 | End: 2021-11-17

## 2021-11-17 RX ORDER — VALACYCLOVIR 1 G/1
1 TABLET, FILM COATED ORAL
Qty: 20 | Refills: 0 | Status: COMPLETED | COMMUNITY
Start: 2019-06-14 | End: 2021-11-17

## 2021-11-17 NOTE — HEALTH RISK ASSESSMENT
[Good] : ~his/her~  mood as  good [No] : In the past 12 months have you used drugs other than those required for medical reasons? No [Patient reported PAP Smear was normal] : Patient reported PAP Smear was normal [No falls in past year] : Patient reported no falls in the past year [0] : 2) Feeling down, depressed, or hopeless: Not at all (0) [PHQ-2 Negative - No further assessment needed] : PHQ-2 Negative - No further assessment needed [Patient declined Retinal Exam] : Patient declined Retinal Exam. [HIV test declined] : HIV test declined [Hepatitis C test declined] : Hepatitis C test declined [None] : None [With Family] : lives with family [Graduate School] : graduate school [Significant Other] : lives with significant other [# Of Children ___] : has [unfilled] children [Sexually Active] : sexually active [Feels Safe at Home] : Feels safe at home [Fully functional (bathing, dressing, toileting, transferring, walking, feeding)] : Fully functional (bathing, dressing, toileting, transferring, walking, feeding) [Fully functional (using the telephone, shopping, preparing meals, housekeeping, doing laundry, using] : Fully functional and needs no help or supervision to perform IADLs (using the telephone, shopping, preparing meals, housekeeping, doing laundry, using transportation, managing medications and managing finances) [Reports normal functional visual acuity (ie: able to read med bottle)] : Reports normal functional visual acuity [Smoke Detector] : smoke detector [Carbon Monoxide Detector] : carbon monoxide detector [Safety elements used in home] : safety elements used in home [Seat Belt] :  uses seat belt [Sunscreen] : uses sunscreen [Patient/Caregiver not ready to engage] : , patient/caregiver not ready to engage [] : No [de-identified] : active [de-identified] : well balanced [[id number]] : 0 [Change in mental status noted] : No change in mental status noted [Language] : denies difficulty with language [High Risk Behavior] : no high risk behavior [Reports changes in hearing] : Reports no changes in hearing [Reports changes in vision] : Reports no changes in vision [Reports changes in dental health] : Reports no changes in dental health [Guns at Home] : no guns at home [Travel to Developing Areas] : does not  travel to developing areas [TB Exposure] : is not being exposed to tuberculosis [Caregiver Concerns] : does not have caregiver concerns [PapSmearDate] : 07/21 [PapSmearComments] : GYN Dr. Wilson [AdvancecareDate] : 11/21

## 2021-11-17 NOTE — CURRENT MEDS
[None] : Patient does not have any barriers to medication adherence [Yes] : Reviewed medication list for presence of high-risk medications. [Takes medication as prescribed] : takes

## 2021-11-17 NOTE — HISTORY OF PRESENT ILLNESS
[FreeTextEntry1] : here for annual well check [de-identified] : this is a 25yo pmhx HSV 1/ genital herpes, otherwise denies pertinent, here for annual well check.\par o/w in no acute distress, no other major concerns and reports feeling well. \par will evaluate and manage as appropriate.

## 2021-11-17 NOTE — ASSESSMENT
[FreeTextEntry1] : preventative health\par annual wellness- 11/17/2021\par Tdap- counseled on vaccine, administered today, tolerated well. \par GYN for Pap- 07/2021, normal Dr. Wilson GYN \par full fasting BW - "labs drawn in office" \par \par HSV 1/ 2\par on Valacyclovir\par conservative mgmt\par in no acute distress and o/w offers no complaints \par

## 2021-11-17 NOTE — COUNSELING
[Fall prevention counseling provided] : Fall prevention counseling provided [Behavioral health counseling provided] : Behavioral health counseling provided [Sleep ___ hours/day] : Sleep [unfilled] hours/day [Engage in a relaxing activity] : Engage in a relaxing activity [Plan in advance] : Plan in advance [None] : None [Good understanding] : Patient has a good understanding of lifestyle changes and steps needed to achieve self management goal [de-identified] : Maintain balanced diet of whole grain, fruits and vegetables, lean meats, skinless poultry, fish, beans, soy products, eggs, nuts, and low fat dairy as per FDA dietary guidelines. \par Avoid high calorie/low nutrient foods. \par vegetables/fruits- at least 5 servings/day, \par whole grains- 100% whole grain and at least 3 grams fiber/day.\par reduce/eliminate saturated fat- less than 5% on nutrition labels (ex. baum, deli meat, hot dogs, fried foods, cookies, ice cream, chips, soft drinks, etc.)\par stay within your FDA recommended daily calorie needs or use the plate method to portion intake. \par Avoid fast food and limit eating out. When eating out choose healthy alternatives (ex. grilled, baked, steamed. low fat dressings. avoid french fries).\par DO NOT CONSUME FOODS YOU ARE ALLERGIC TO DESPITE DIETARY RECOMMENDATIONS.\par \par For more information you can visit www.Health.gov \par \par Incorporate regular physical activity most days of the week. \par Regular aerobic activity- moderate intensity, most days/wk, at least 30min/day- ex. brisk walk 2.5miles/hr, ballroom dancing, water aerobics, light yard work (raking/lawn mowing) actively playing basketball, biking 10miles/hr.\par Muscle strengthening and strength/resistance exercises 2-3days/wk- ex. free weights, resistance bands, your own weight (squats/pull-ups/push-ups).\par Neuro-motor exercises for balance/agility/coordination and flexibility exercises 2-3days/wk, 20-30min/day- ex. yoga and lei-chi.\par Bone strengthening at least 30min/day, most days of the week- ex. weights, resistance bands, running, brisk walking, jumping rope, stair climbing, tennis.\par Decrease sedentary time. \par LISTEN TO YOUR BODY AND MODIFY ACTIVITY AS NEEDED- DO NOT OVEREXERT YOURSELF DURING PHYSICAL ACTIVITY DESPITE RECOMMENDATION.\par \par For more information you can visit www.Health.gov \par \par counseled on safe sex practices, patient uses [facility]

## 2021-11-17 NOTE — PHYSICAL EXAM
[No Acute Distress] : no acute distress [Well Nourished] : well nourished [Well Developed] : well developed [Normal Sclera/Conjunctiva] : normal sclera/conjunctiva [PERRL] : pupils equal round and reactive to light [No Respiratory Distress] : no respiratory distress  [No Accessory Muscle Use] : no accessory muscle use [Clear to Auscultation] : lungs were clear to auscultation bilaterally [Declined Breast Exam] : declined breast exam  [Normal Posterior Cervical Nodes] : no posterior cervical lymphadenopathy [Normal Anterior Cervical Nodes] : no anterior cervical lymphadenopathy [Normal] : no joint swelling and grossly normal strength and tone [No Rash] : no rash [Coordination Grossly Intact] : coordination grossly intact [Normal Gait] : normal gait [Normal Affect] : the affect was normal [Normal Insight/Judgement] : insight and judgment were intact [de-identified] : GYN

## 2021-11-18 ENCOUNTER — TRANSCRIPTION ENCOUNTER (OUTPATIENT)
Age: 26
End: 2021-11-18

## 2021-11-18 LAB
25(OH)D3 SERPL-MCNC: 27.4 NG/ML
ALBUMIN SERPL ELPH-MCNC: 4.5 G/DL
ALP BLD-CCNC: 64 U/L
ALT SERPL-CCNC: 16 U/L
ANION GAP SERPL CALC-SCNC: 11 MMOL/L
AST SERPL-CCNC: 15 U/L
BASOPHILS # BLD AUTO: 0.05 K/UL
BASOPHILS NFR BLD AUTO: 0.9 %
BILIRUB SERPL-MCNC: 0.2 MG/DL
BUN SERPL-MCNC: 12 MG/DL
CALCIUM SERPL-MCNC: 9.5 MG/DL
CHLORIDE SERPL-SCNC: 105 MMOL/L
CHOLEST SERPL-MCNC: 187 MG/DL
CO2 SERPL-SCNC: 23 MMOL/L
CREAT SERPL-MCNC: 0.71 MG/DL
EOSINOPHIL # BLD AUTO: 0.22 K/UL
EOSINOPHIL NFR BLD AUTO: 4.1 %
ESTIMATED AVERAGE GLUCOSE: 97 MG/DL
GLUCOSE SERPL-MCNC: 97 MG/DL
HBA1C MFR BLD HPLC: 5 %
HCT VFR BLD CALC: 44.3 %
HDLC SERPL-MCNC: 66 MG/DL
HGB BLD-MCNC: 14 G/DL
IMM GRANULOCYTES NFR BLD AUTO: 0.4 %
IRON SATN MFR SERPL: 33 %
IRON SERPL-MCNC: 103 UG/DL
LDLC SERPL CALC-MCNC: 102 MG/DL
LYMPHOCYTES # BLD AUTO: 1.85 K/UL
LYMPHOCYTES NFR BLD AUTO: 34.5 %
MAN DIFF?: NORMAL
MCHC RBC-ENTMCNC: 30.2 PG
MCHC RBC-ENTMCNC: 31.6 GM/DL
MCV RBC AUTO: 95.7 FL
MONOCYTES # BLD AUTO: 0.39 K/UL
MONOCYTES NFR BLD AUTO: 7.3 %
NEUTROPHILS # BLD AUTO: 2.83 K/UL
NEUTROPHILS NFR BLD AUTO: 52.8 %
NONHDLC SERPL-MCNC: 121 MG/DL
PLATELET # BLD AUTO: 261 K/UL
POTASSIUM SERPL-SCNC: 4.7 MMOL/L
PROT SERPL-MCNC: 6.5 G/DL
RBC # BLD: 4.63 M/UL
RBC # FLD: 12.9 %
SODIUM SERPL-SCNC: 139 MMOL/L
T4 FREE SERPL-MCNC: 1 NG/DL
TIBC SERPL-MCNC: 314 UG/DL
TRIGL SERPL-MCNC: 92 MG/DL
TSH SERPL-ACNC: 1.25 UIU/ML
UIBC SERPL-MCNC: 212 UG/DL
VIT B12 SERPL-MCNC: 741 PG/ML
WBC # FLD AUTO: 5.36 K/UL

## 2022-02-18 NOTE — ED ADULT TRIAGE NOTE - PAIN RATING/NUMBER SCALE (0-10): REST
MET WITH PATIENT, NOTIFIED OF STATUS CHANGE TO INPATIENT. IMM REVIEWED AND SIGNED, PT VERBALIZED UNDERSTANDING.
Received a call back from pt's grandson stating that he spoke with pt and would like to reschedule her appt to next Tuesday at 9:00 AM. Informed grandson that Tuesday is [place] and office will be closed. Appt scheduled on 3/8/22 at 9:00 AM. Pt to call if this does not work with her schedule.   
0

## 2022-03-10 ENCOUNTER — APPOINTMENT (OUTPATIENT)
Dept: OBGYN | Facility: CLINIC | Age: 27
End: 2022-03-10
Payer: COMMERCIAL

## 2022-03-10 VITALS
SYSTOLIC BLOOD PRESSURE: 110 MMHG | HEART RATE: 75 BPM | TEMPERATURE: 97.3 F | RESPIRATION RATE: 15 BRPM | WEIGHT: 200 LBS | BODY MASS INDEX: 30.31 KG/M2 | HEIGHT: 68 IN | DIASTOLIC BLOOD PRESSURE: 60 MMHG

## 2022-03-10 PROCEDURE — 99213 OFFICE O/P EST LOW 20 MIN: CPT

## 2022-03-10 NOTE — HISTORY OF PRESENT ILLNESS
[FreeTextEntry1] : 25 yo doing well with JUnel1/20 she also had covid in dec. disc ASA . \par She is happy with this pill.  [Currently Active] : currently active [Men] : men [Vaginal] : vaginal [No] : No

## 2022-05-26 ENCOUNTER — TRANSCRIPTION ENCOUNTER (OUTPATIENT)
Age: 27
End: 2022-05-26

## 2022-06-24 ENCOUNTER — TRANSCRIPTION ENCOUNTER (OUTPATIENT)
Age: 27
End: 2022-06-24

## 2022-08-23 ENCOUNTER — RX RENEWAL (OUTPATIENT)
Age: 27
End: 2022-08-23

## 2022-09-10 ENCOUNTER — APPOINTMENT (OUTPATIENT)
Dept: OBGYN | Facility: CLINIC | Age: 27
End: 2022-09-10

## 2022-09-10 VITALS
SYSTOLIC BLOOD PRESSURE: 120 MMHG | TEMPERATURE: 97.7 F | DIASTOLIC BLOOD PRESSURE: 80 MMHG | BODY MASS INDEX: 30.31 KG/M2 | WEIGHT: 200 LBS | HEIGHT: 68 IN

## 2022-09-10 PROCEDURE — 99395 PREV VISIT EST AGE 18-39: CPT

## 2022-09-12 NOTE — DISCUSSION/SUMMARY
[FreeTextEntry1] : no need for valtrex at this time \par no smoking, RBAD .\par Discussed the risks of DVT and blood clots,strokes\par  good and bad side effects of the pill discussed and instructions on how to take pills and when to use back up. \par Encouraged exercise , \par good diet filled with,plant based foods, calcium and vit.D.rtn 12 months. \par Discussed SBE,\par Discussed the NIH suggests minimum of 2.5 hours of exercise a week\par If contracted covid call office to change to progesterone only pill(Slynd) for 3 months) DIsc. hypercoagulative state/or ASA therapy low dose\par Answered any questions she may have.\par

## 2022-09-13 LAB — CYTOLOGY CVX/VAG DOC THIN PREP: ABNORMAL

## 2022-10-21 NOTE — ED ADULT TRIAGE NOTE - BSA (M2)
From: Sonia JOSÉ  To: Daniela Caldwell  Sent: 9/30/2022 10:59 AM CDT  Subject: Message from RODOLFO De La Rosa,     Hopefully you have a fast recovery! I have let Estrella know about your hospitalization as well.     This can wait. Estrella wanted you to make a follow up appt with her. Estrella was reviewing your chart and noticed your A1C and that you were due to be seen.     Hopefully you can go home soon. Just call to make an appt whenever you are able. Get some rest!    Thank you,  Sonia, RN  [phone]  
Per message from pt \"While I was in the hospital for a week,  they had me on Lantus and some other insulin instead of the Humulin R U500. When I am released to go home should I resume taking the Humulin R U500, instead of what I was taking in the hospital? I was at [facility]. \"  RN will route this to Estrella to address  Pt was discharged 10/4/22 and looks like according to the MAR it says pt should take  HumuLIN R U-500 KwikPen 500 UNIT/ML pen-injector    Generic drug: insulin regular human (CONCENTRATED)  Inject 135 Units into the skin daily (with breakfast)  Units daily (with lunch) AND 15 Units daily (with dinner). Plus scale, BG < 100, subtract 10 units. BG > 150, add 5 units. BG > 200, add 10 units. BG > 250, add 15 units. BG > 300, add 20 units. Max daily dose 320 units       
Please have patient give us an update of her blood sugars since discharge and current regimen of insulin that she has been taking since discharge.  
Second message sent to Pt. Via [place].Pt has read previous message  
Voice message left for the pt to please check her messages on [employer]. Message sent to the pt on [employer] to request blood sugars since discharge and also her regimen.     Insulin instructions according to AVS:    
1.78

## 2023-02-20 ENCOUNTER — APPOINTMENT (OUTPATIENT)
Dept: OBGYN | Facility: CLINIC | Age: 28
End: 2023-02-20
Payer: COMMERCIAL

## 2023-02-20 VITALS
HEIGHT: 68 IN | DIASTOLIC BLOOD PRESSURE: 74 MMHG | HEART RATE: 74 BPM | WEIGHT: 217 LBS | RESPIRATION RATE: 18 BRPM | TEMPERATURE: 98.2 F | BODY MASS INDEX: 32.89 KG/M2 | SYSTOLIC BLOOD PRESSURE: 123 MMHG

## 2023-02-20 DIAGNOSIS — B00.9 HERPESVIRAL INFECTION, UNSPECIFIED: ICD-10-CM

## 2023-02-20 PROCEDURE — 99214 OFFICE O/P EST MOD 30 MIN: CPT

## 2023-02-20 RX ORDER — ACYCLOVIR 50 MG/G
5 CREAM TOPICAL
Qty: 1 | Refills: 1 | Status: ACTIVE | COMMUNITY
Start: 2023-02-20 | End: 1900-01-01

## 2023-02-20 RX ORDER — VALACYCLOVIR 500 MG/1
500 TABLET, FILM COATED ORAL TWICE DAILY
Qty: 10 | Refills: 5 | Status: ACTIVE | COMMUNITY
Start: 2019-06-20 | End: 1900-01-01

## 2023-02-20 NOTE — DISCUSSION/SUMMARY
[FreeTextEntry1] : disc treatment options \par she will do episodic, she has been taking the oral no improvement but I sugested doing a gm for the mynor 2.5 days

## 2023-02-20 NOTE — PHYSICAL EXAM
[Chaperone Declined] : Patient declined chaperone [Appropriately responsive] : appropriately responsive [Alert] : alert [No Acute Distress] : no acute distress [Soft] : soft [Non-tender] : non-tender [Non-distended] : non-distended [No HSM] : No HSM [No Lesions] : no lesions [No Mass] : no mass [Oriented x3] : oriented x3 [FreeTextEntry1] : one lesion near the

## 2023-02-20 NOTE — HISTORY OF PRESENT ILLNESS
[FreeTextEntry1] : 26 yo her because she thinks she has a herpes lesion. She had a bad initial outbreak in 2019 none since that time\par She noticed burning and irritation a day or so ago. \par

## 2023-03-16 ENCOUNTER — APPOINTMENT (OUTPATIENT)
Dept: OBGYN | Facility: CLINIC | Age: 28
End: 2023-03-16

## 2023-06-12 ENCOUNTER — APPOINTMENT (OUTPATIENT)
Dept: OBGYN | Facility: CLINIC | Age: 28
End: 2023-06-12

## 2023-06-12 ENCOUNTER — APPOINTMENT (OUTPATIENT)
Dept: OBGYN | Facility: CLINIC | Age: 28
End: 2023-06-12
Payer: COMMERCIAL

## 2023-06-12 VITALS
HEART RATE: 70 BPM | BODY MASS INDEX: 32.13 KG/M2 | SYSTOLIC BLOOD PRESSURE: 125 MMHG | DIASTOLIC BLOOD PRESSURE: 70 MMHG | HEIGHT: 68 IN | RESPIRATION RATE: 16 BRPM | WEIGHT: 212 LBS

## 2023-06-12 DIAGNOSIS — Z01.419 ENCOUNTER FOR GYNECOLOGICAL EXAMINATION (GENERAL) (ROUTINE) W/OUT ABNORMAL FINDINGS: ICD-10-CM

## 2023-06-12 DIAGNOSIS — Z30.9 ENCOUNTER FOR CONTRACEPTIVE MANAGEMENT, UNSPECIFIED: ICD-10-CM

## 2023-06-12 DIAGNOSIS — N76.4 ABSCESS OF VULVA: ICD-10-CM

## 2023-06-12 PROCEDURE — 99395 PREV VISIT EST AGE 18-39: CPT

## 2023-06-12 RX ORDER — ERYTHROMYCIN 20 MG/ML
2 SOLUTION TOPICAL TWICE DAILY
Qty: 1 | Refills: 3 | Status: ACTIVE | COMMUNITY
Start: 2023-06-12 | End: 1900-01-01

## 2023-06-12 RX ORDER — NORETHINDRONE ACETATE AND ETHINYL ESTRADIOL AND FERROUS FUMARATE 1MG-20(24)
1-20 KIT ORAL
Qty: 3 | Refills: 3 | Status: ACTIVE | COMMUNITY
Start: 2020-12-17 | End: 1900-01-01

## 2023-06-12 NOTE — PHYSICAL EXAM
[Chaperone Declined] : Patient declined chaperone [Appropriately responsive] : appropriately responsive [Alert] : alert [No Acute Distress] : no acute distress [No Lymphadenopathy] : no lymphadenopathy [Soft] : soft [Non-tender] : non-tender [Non-distended] : non-distended [No HSM] : No HSM [No Lesions] : no lesions [No Mass] : no mass [Oriented x3] : oriented x3 [Examination Of The Breasts] : a normal appearance [No Masses] : no breast masses were palpable [Labia Majora] : normal [Labia Minora] : normal [Normal] : normal [Uterine Adnexae] : normal [FreeTextEntry2] : no abscess seen

## 2023-06-12 NOTE — HISTORY OF PRESENT ILLNESS
[FreeTextEntry1] : 26 yo here for av, oc refill, also had 3x a amall bump to left lower labia , that responds to warm compresses. and goes away

## 2023-06-15 LAB — CYTOLOGY CVX/VAG DOC THIN PREP: NORMAL

## 2023-07-05 ENCOUNTER — TRANSCRIPTION ENCOUNTER (OUTPATIENT)
Age: 28
End: 2023-07-05

## 2023-11-24 NOTE — ED ADULT NURSE NOTE - CADM POA CENTRAL LINE
----- Message from CLAUDIA Reeder sent at 11/24/2023  9:39 AM CST -----  Please call. KUB showing moderate stool burden. Would recommend restarting miralax, 1 capful daily in 6-8oz of something to drink. Take consistently to help alleviate the stool burden in there. Will see what h pylori testing reveals. Thanks.  
Patient informed of results. Patients verbalized understanding   
No

## 2024-04-10 ENCOUNTER — TRANSCRIPTION ENCOUNTER (OUTPATIENT)
Age: 29
End: 2024-04-10

## 2024-05-09 NOTE — PROCEDURE
[Cervical Pap Smear] : cervical Pap smear [Liquid Base] : liquid base [GC & Chlamydia via Pap] : GC & Chlamydia via Pap [Tolerated Well] : the patient tolerated the procedure well [No Complications] : there were no complications Heterosexual

## 2024-05-10 ENCOUNTER — TRANSCRIPTION ENCOUNTER (OUTPATIENT)
Age: 29
End: 2024-05-10

## 2024-07-18 ENCOUNTER — RX RENEWAL (OUTPATIENT)
Age: 29
End: 2024-07-18